# Patient Record
Sex: MALE | Race: WHITE | NOT HISPANIC OR LATINO | Employment: OTHER | URBAN - NONMETROPOLITAN AREA
[De-identification: names, ages, dates, MRNs, and addresses within clinical notes are randomized per-mention and may not be internally consistent; named-entity substitution may affect disease eponyms.]

---

## 2017-08-02 ENCOUNTER — TRANSCRIBE ORDERS (OUTPATIENT)
Dept: ADMINISTRATIVE | Facility: HOSPITAL | Age: 62
End: 2017-08-02

## 2017-08-02 DIAGNOSIS — Z13.818 ENCOUNTER FOR SCREENING FOR OTHER DIGESTIVE SYSTEM DISORDERS: Primary | ICD-10-CM

## 2017-08-10 ENCOUNTER — APPOINTMENT (OUTPATIENT)
Dept: CT IMAGING | Facility: HOSPITAL | Age: 62
End: 2017-08-10

## 2017-08-14 ENCOUNTER — HOSPITAL ENCOUNTER (OUTPATIENT)
Dept: CT IMAGING | Facility: HOSPITAL | Age: 62
Discharge: HOME | End: 2017-08-14
Admitting: FAMILY MEDICINE

## 2017-08-14 DIAGNOSIS — Z13.818 ENCOUNTER FOR SCREENING FOR OTHER DIGESTIVE SYSTEM DISORDERS: ICD-10-CM

## 2017-08-14 LAB — CREAT BLDA-MCNC: 1.1 MG/DL (ref 0.6–1.3)

## 2017-08-14 PROCEDURE — 0 IOPAMIDOL 61 % SOLUTION: Performed by: FAMILY MEDICINE

## 2017-08-14 PROCEDURE — 82565 ASSAY OF CREATININE: CPT

## 2017-08-14 PROCEDURE — 74178 CT ABD&PLV WO CNTR FLWD CNTR: CPT

## 2017-08-14 RX ADMIN — IOPAMIDOL 100 ML: 612 INJECTION, SOLUTION INTRAVENOUS at 13:45

## 2023-01-19 ENCOUNTER — OFFICE VISIT (OUTPATIENT)
Dept: UROLOGY | Facility: CLINIC | Age: 68
End: 2023-01-19

## 2023-01-19 VITALS
TEMPERATURE: 98.3 F | WEIGHT: 278 LBS | HEART RATE: 89 BPM | SYSTOLIC BLOOD PRESSURE: 122 MMHG | OXYGEN SATURATION: 95 % | RESPIRATION RATE: 18 BRPM | HEIGHT: 73 IN | BODY MASS INDEX: 36.84 KG/M2 | DIASTOLIC BLOOD PRESSURE: 60 MMHG

## 2023-01-19 DIAGNOSIS — Z80.42 FAMILY HX OF PROSTATE CANCER: ICD-10-CM

## 2023-01-19 DIAGNOSIS — N43.3 HYDROCELE, UNSPECIFIED HYDROCELE TYPE: Primary | ICD-10-CM

## 2023-01-19 PROCEDURE — 99203 OFFICE O/P NEW LOW 30 MIN: CPT | Performed by: UROLOGY

## 2023-01-19 NOTE — PROGRESS NOTES
"Chief Complaint  Chief Complaint   Patient presents with   • Testicular Mass      Referring Provider:  No ref. provider found    HPI  Mr. Scott is a 67 y.o. male with below past medical history who presents with right testicular swelling x 3-6 mo. He says it never goes all the wya down, occasionally has discomfort.     Denies LUTS    Past Medical History  Past Medical History:   Diagnosis Date   • Allergies    • Anxiety    • COPD (chronic obstructive pulmonary disease) (HCC)    • Edema    • High blood pressure    • Sepsis (HCC)        Past Surgical History  Past Surgical History:   Procedure Laterality Date   • BACK SURGERY  2010   • FLAT FOOT RECONSTRUCTION  1983       Medications  No current outpatient medications on file.    Allergies  Allergies   Allergen Reactions   • Codeine Other (See Comments)     Felt like he was going to die-sweats ect       Social History  Social History     Socioeconomic History   • Marital status:    Tobacco Use   • Smoking status: Every Day     Types: Cigarettes   • Smokeless tobacco: Never   • Tobacco comments:     4-5 cigs a day   Vaping Use   • Vaping Use: Never used   Substance and Sexual Activity   • Alcohol use: Never   • Drug use: Never   • Sexual activity: Defer       Family History  Family History   Problem Relation Age of Onset   • Hypertension Father    • Cancer Father    • Cirrhosis Father    • Cancer Mother        Physical Exam  Visit Vitals  /60 (BP Location: Left arm, Patient Position: Sitting, Cuff Size: Adult)   Pulse 89   Temp 98.3 °F (36.8 °C) (Temporal)   Resp 18   Ht 185.4 cm (73\")   Wt 126 kg (278 lb)   SpO2 95%   BMI 36.68 kg/m²     Physical exam was performed and was notable for obese    Genitourinary  Penis: circumcised penis, glans normal, no penile discharge.  No rashes/lesions.    Testes: descended bilaterally, possible large right hernia appreciated.    Labs  No results found for: PSA    Lab Results   Component Value Date    CREATININE 1.10 " 08/14/2017       No results found for: WBC, HGB, HCT, MCV, PLT    Brief Urine Lab Results     None           Radiologic Studies  No Images in the past 120 days found..      Assessment  Mr. Scott is a 67 y.o. male who presents with large right inguinal hernia vs hydrocele.  Difficult to tell on exam.  + FHx of metastatic PCa in father    Plan  1.  PSA  2.  CT abdomen pelvis w/ contrast with cuts down to mid thigh.  Follow-up after.      Camron Wiley MD

## 2023-01-20 LAB — PSA SERPL-MCNC: 0.89 NG/ML (ref 0–4)

## 2023-02-13 ENCOUNTER — OFFICE VISIT (OUTPATIENT)
Dept: UROLOGY | Facility: CLINIC | Age: 68
End: 2023-02-13

## 2023-02-13 ENCOUNTER — HOSPITAL ENCOUNTER (OUTPATIENT)
Dept: CT IMAGING | Facility: HOSPITAL | Age: 68
Discharge: HOME | End: 2023-02-13
Admitting: UROLOGY

## 2023-02-13 VITALS
WEIGHT: 278 LBS | TEMPERATURE: 97.3 F | HEART RATE: 102 BPM | OXYGEN SATURATION: 97 % | SYSTOLIC BLOOD PRESSURE: 152 MMHG | HEIGHT: 73 IN | BODY MASS INDEX: 36.84 KG/M2 | DIASTOLIC BLOOD PRESSURE: 84 MMHG

## 2023-02-13 DIAGNOSIS — K40.90 INGUINAL HERNIA WITHOUT OBSTRUCTION OR GANGRENE, RECURRENCE NOT SPECIFIED, UNSPECIFIED LATERALITY: ICD-10-CM

## 2023-02-13 DIAGNOSIS — N43.3 HYDROCELE, UNSPECIFIED HYDROCELE TYPE: Primary | ICD-10-CM

## 2023-02-13 DIAGNOSIS — N43.3 HYDROCELE, UNSPECIFIED HYDROCELE TYPE: ICD-10-CM

## 2023-02-13 LAB — CREAT BLDA-MCNC: 1.2 MG/DL (ref 0.6–1.3)

## 2023-02-13 PROCEDURE — 82565 ASSAY OF CREATININE: CPT

## 2023-02-13 PROCEDURE — 74177 CT ABD & PELVIS W/CONTRAST: CPT

## 2023-02-13 PROCEDURE — 25010000002 IOPAMIDOL 61 % SOLUTION: Performed by: UROLOGY

## 2023-02-13 PROCEDURE — 99213 OFFICE O/P EST LOW 20 MIN: CPT | Performed by: UROLOGY

## 2023-02-13 RX ORDER — LISINOPRIL 20 MG/1
20 TABLET ORAL 2 TIMES DAILY
COMMUNITY

## 2023-02-13 RX ADMIN — IOPAMIDOL 95 ML: 612 INJECTION, SOLUTION INTRAVENOUS at 11:37

## 2023-02-13 NOTE — PROGRESS NOTES
"Chief Complaint   Patient presents with   • Hydrocele     3 wk fu w/ CT results        HPI  Ms. Scott is a 67 y.o. male with history below in assessment, who presents for follow up.     At this visit here to discuss CT> Getting more uncomfortable.    Past Medical History:   Diagnosis Date   • Allergies    • Anxiety    • COPD (chronic obstructive pulmonary disease) (HCC)    • Disease of thyroid gland    • Edema    • High blood pressure    • Sepsis (HCC)        Past Surgical History:   Procedure Laterality Date   • BACK SURGERY  2010   • FLAT FOOT RECONSTRUCTION  1983         Current Outpatient Medications:   •  lisinopril (PRINIVIL,ZESTRIL) 20 MG tablet, Take 20 mg by mouth 2 (Two) Times a Day., Disp: , Rfl:   No current facility-administered medications for this visit.     Physical Exam  Visit Vitals  /84 (BP Location: Left arm, Patient Position: Sitting, Cuff Size: Adult)   Pulse 102   Temp 97.3 °F (36.3 °C) (Temporal)   Ht 185.4 cm (73\")   Wt 126 kg (278 lb)   SpO2 97%   BMI 36.68 kg/m²       Labs  Brief Urine Lab Results     None          Lab Results   Component Value Date    CREATININE 1.10 08/14/2017       No results found for: WBC, HGB, HCT, MCV, PLT         Lab Results   Component Value Date    PSA 0.888 01/19/2023             Radiographic Studies  CT Abdomen Pelvis With Contrast  Result Date: 2/13/2023  Impression: 1. Moderate bilateral hydroceles, right greater than left. 2. Small non-strangulated fat-only containing right inguinal hernia. 3. Cholelithiasis. 4. Mild fecal stasis. Sigmoid diverticulosis but no evidence of diverticulitis. Electronically Signed: Say Troncoso  2/13/2023 12:02 PM EST  Workstation ID: [id number]        I have reviewed above labs and imaging.  He clearly has both a hernia and hydrocele.    Assessment  67 y.o. male with right inguinal hernia and large right hydrocele.  This has been chronic and worsening.    Plan  1.  Referral to Dr. Frye for combo hernia repair. I would like to " repair hydrocele at same time. We would likely schedule in June.  We will coordinate with her after she sees him.

## 2023-03-27 ENCOUNTER — OFFICE VISIT (OUTPATIENT)
Dept: UROLOGY | Facility: CLINIC | Age: 68
End: 2023-03-27

## 2023-03-27 DIAGNOSIS — N43.3 HYDROCELE, UNSPECIFIED HYDROCELE TYPE: Primary | ICD-10-CM

## 2023-03-27 DIAGNOSIS — K40.90 INGUINAL HERNIA WITHOUT OBSTRUCTION OR GANGRENE, RECURRENCE NOT SPECIFIED, UNSPECIFIED LATERALITY: ICD-10-CM

## 2023-03-27 PROCEDURE — 99442 PR PHYS/QHP TELEPHONE EVALUATION 11-20 MIN: CPT | Performed by: UROLOGY

## 2023-03-27 NOTE — PROGRESS NOTES
11-minute telephone encounter    Patient has not yet been seen by Dr. Frye to discuss hernia surgery.  He said he never received a call from their office.  I will place another referral today and I I told him to call their office to schedule an appointment with her.  I explained that I want to do a combination surgery and repair his hydrocele at the same time that his hernias repaired.  We discussed the risks and benefits of this, and I explained why the hernia needed to be repaired first.  He will get scheduled and see her and then call us back when he has a surgery date.    You have chosen to receive care through a telephone visit. Do you consent to use a telephone visit for your medical care today? Yes